# Patient Record
Sex: FEMALE | Employment: FULL TIME | URBAN - METROPOLITAN AREA
[De-identification: names, ages, dates, MRNs, and addresses within clinical notes are randomized per-mention and may not be internally consistent; named-entity substitution may affect disease eponyms.]

---

## 2023-05-22 ENCOUNTER — TRANSCRIBE ORDERS (OUTPATIENT)
Dept: SCHEDULING | Age: 53
End: 2023-05-22

## 2023-05-22 DIAGNOSIS — Z12.31 VISIT FOR SCREENING MAMMOGRAM: Primary | ICD-10-CM

## 2024-07-31 ENCOUNTER — OFFICE VISIT (OUTPATIENT)
Age: 54
End: 2024-07-31

## 2024-07-31 VITALS
HEART RATE: 91 BPM | OXYGEN SATURATION: 97 % | DIASTOLIC BLOOD PRESSURE: 76 MMHG | RESPIRATION RATE: 16 BRPM | SYSTOLIC BLOOD PRESSURE: 118 MMHG | TEMPERATURE: 98.3 F

## 2024-07-31 DIAGNOSIS — R30.0 BURNING WITH URINATION: ICD-10-CM

## 2024-07-31 DIAGNOSIS — N39.0 URINARY TRACT INFECTION WITH HEMATURIA, SITE UNSPECIFIED: Primary | ICD-10-CM

## 2024-07-31 DIAGNOSIS — R31.9 URINARY TRACT INFECTION WITH HEMATURIA, SITE UNSPECIFIED: Primary | ICD-10-CM

## 2024-07-31 PROBLEM — Z12.11 ENCOUNTER FOR SCREENING FOR MALIGNANT NEOPLASM OF COLON: Status: ACTIVE | Noted: 2024-06-26

## 2024-07-31 PROBLEM — K63.5 POLYP OF COLON: Status: ACTIVE | Noted: 2024-06-26

## 2024-07-31 PROBLEM — I87.2 VENOUS INSUFFICIENCY: Status: ACTIVE | Noted: 2017-03-08

## 2024-07-31 LAB
BILIRUBIN, URINE, POC: NEGATIVE
BLOOD URINE, POC: ABNORMAL
GLUCOSE URINE, POC: NEGATIVE
KETONES, URINE, POC: NEGATIVE
LEUKOCYTE ESTERASE, URINE, POC: ABNORMAL
NITRITE, URINE, POC: NEGATIVE
PH, URINE, POC: 5 (ref 4.6–8)
SPECIFIC GRAVITY, URINE, POC: 1.02 (ref 1–1.03)
URINALYSIS COLOR, POC: ABNORMAL
UROBILINOGEN, POC: ABNORMAL

## 2024-07-31 RX ORDER — NITROFURANTOIN 25; 75 MG/1; MG/1
100 CAPSULE ORAL 2 TIMES DAILY
Qty: 10 CAPSULE | Refills: 0 | Status: SHIPPED | OUTPATIENT
Start: 2024-07-31 | End: 2024-08-05

## 2024-07-31 RX ORDER — ESTRADIOL 1 MG/1
TABLET ORAL
COMMUNITY
Start: 2024-05-19

## 2024-07-31 RX ORDER — BUPROPION HYDROCHLORIDE 300 MG/1
1 TABLET ORAL DAILY
COMMUNITY
Start: 2015-08-09

## 2024-07-31 ASSESSMENT — ENCOUNTER SYMPTOMS
CHANGE IN BOWEL HABIT: 0
BLURRED VISION: 0
COUGH: 0

## 2024-07-31 NOTE — PROGRESS NOTES
PROGRESS NOTE    SUBJECTIVE:   Jennifer Phillips is a 54 y.o. female seen for a visit regarding urinary frequency and burning with urination     Comes to the clinic for urinary burning and frequency. Symptoms started Sunday (7.28.24) evening and have continued to persist. She had a GYN procedure last week. Her last UTI was decades ago. She had not had a new sex partner, change in soaps or lotions, but she historically is a poor water drinker and often holds her urine for long periods of time. She denies fever, chills, abd pain, or flank pain.         Review of Systems   Constitutional: Positive for malaise/fatigue. Negative for chills and fever.   Eyes:  Negative for blurred vision.   Cardiovascular:  Negative for chest pain.   Respiratory:  Negative for cough.    Gastrointestinal:  Negative for change in bowel habit.   Genitourinary:  Positive for dysuria, frequency and urgency. Negative for bladder incontinence, flank pain, hematuria, hesitancy, incomplete emptying and pelvic pain.   Neurological:  Negative for dizziness, headaches and light-headedness.       Current Outpatient Medications   Medication Sig Dispense Refill    buPROPion (WELLBUTRIN XL) 300 MG extended release tablet Take 1 tablet by mouth daily      estradiol (ESTRACE) 1 MG tablet 90      nitrofurantoin, macrocrystal-monohydrate, (MACROBID) 100 MG capsule Take 1 capsule by mouth 2 times daily for 5 days 10 capsule 0    VORTIoxetine HBr (TRINTELLIX) 20 MG TABS tablet 1 tablet       No current facility-administered medications for this visit.     Allergies   Allergen Reactions    Tramadol Nausea And Vomiting     Patient Active Problem List   Diagnosis    Encounter for screening for malignant neoplasm of colon    Polyp of colon    Recurrent major depressive disorder, in partial remission (HCC)    Varicose veins of leg with pain    Venous insufficiency     No past medical history on file.  No past surgical history on file.  No family history on file.  Social

## 2024-08-01 LAB
APPEARANCE UR: CLEAR
BACTERIA #/AREA URNS HPF: ABNORMAL /[HPF]
BILIRUB UR QL STRIP: NEGATIVE
CASTS URNS QL MICRO: ABNORMAL /LPF
COLOR UR: YELLOW
EPI CELLS #/AREA URNS HPF: >10 /HPF (ref 0–10)
GLUCOSE UR QL STRIP: NEGATIVE
KETONES UR QL STRIP: NEGATIVE
LEUKOCYTE ESTERASE UR QL STRIP: ABNORMAL
MICRO URNS: ABNORMAL
NITRITE UR QL STRIP: NEGATIVE
PH UR STRIP: 5.5 [PH] (ref 5–7.5)
PROT UR QL STRIP: ABNORMAL
RBC #/AREA URNS HPF: ABNORMAL /HPF (ref 0–2)
SP GR UR STRIP: 1.02 (ref 1–1.03)

## 2024-08-02 LAB — BACTERIA UR CULT: ABNORMAL

## 2024-08-06 LAB — BACTERIA UR CULT: ABNORMAL
